# Patient Record
Sex: FEMALE | Race: WHITE
[De-identification: names, ages, dates, MRNs, and addresses within clinical notes are randomized per-mention and may not be internally consistent; named-entity substitution may affect disease eponyms.]

---

## 2018-02-23 ENCOUNTER — HOSPITAL ENCOUNTER (OUTPATIENT)
Dept: HOSPITAL 53 - M SFHCWAGY | Age: 53
End: 2018-02-23
Attending: NURSE PRACTITIONER
Payer: COMMERCIAL

## 2018-02-23 DIAGNOSIS — Z12.4: Primary | ICD-10-CM

## 2019-06-03 ENCOUNTER — HOSPITAL ENCOUNTER (OUTPATIENT)
Dept: HOSPITAL 53 - M SFHCWAGY | Age: 54
End: 2019-06-03
Attending: NURSE PRACTITIONER
Payer: COMMERCIAL

## 2019-06-03 DIAGNOSIS — Z12.4: Primary | ICD-10-CM

## 2019-06-03 PROCEDURE — 87624 HPV HI-RISK TYP POOLED RSLT: CPT

## 2019-06-04 LAB — HPV LOW VOL RFLX: (no result)

## 2019-08-28 ENCOUNTER — HOSPITAL ENCOUNTER (OUTPATIENT)
Dept: HOSPITAL 53 - M RAD | Age: 54
End: 2019-08-28
Attending: PHYSICIAN ASSISTANT
Payer: COMMERCIAL
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
## 2019-08-28 DIAGNOSIS — S83.511A: Primary | ICD-10-CM
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
## 2019-08-28 NOTE — REP
MRI right knee without contrast:

 

History:  Unilateral primary osteoarthritis right knee.  No comparison imaging.

 

Technique:  Axial, coronal, and sagittal imaging planes utilized for T1, proton

density T2-weighted scans obtain in the usual fashion with and without fat

saturation.

 

MRI findings:  There is a large right knee joint effusion.  A fairly large Baker

cyst is seen in the posteromedial popliteal soft tissues with a vertical span of

7.2 cm.  There is heterogeneous signal intensity in the joint fluid and in the

fluid within the Baker's cyst consistent with proteinaceous material.

 

Cortical and medullary bone signal intensity are normal except for a small area

of subcortical cyst formation beneath the tibial spines.  There is a very subtle

area of marrow edema in the posterior aspect the medial tibial plateau as well.

 

Patellar and quadriceps tendons appear intact.  There is mild anterior bony

hypertrophy at the distal patellar tendon attachment.  The posterior cruciate

ligament has an intact appearance.  The anterior cruciate ligament is disrupted

and discontinuous.  There is a no evidence of medial or lateral collateral

ligament disruption.  There is a small inner free margin tear of the anterior

body of the lateral meniscus.  No medial meniscal tear is appreciated.

 

There is moderate fairly diffuse chondromalacia in the patella.  There are tiny

subtle zone of subcortical marrow edema in the lateral patellar facet.  There is

a nearly full-thickness cartilage defect in the lateral tibial plateau.  There is

moderate chondromalacia in the lateral femoral condyle most pronounced

posteriorly.  There is advanced chondromalacia in the medial tibiofemoral

compartment with large areas of articular cartilage thinning in the medial

femoral condyle.

 

Impression:

 

Large joint effusion.  Large Baker's cyst.  Complete tear anterior cruciate

ligament.  Inner free margin tear anterior body lateral meniscus.  Advanced

chondromalacia changes in all three compartments as above.

 

 

Electronically Signed by

Eze Rosenthal MD 08/28/2019 07:26 P

## 2020-06-04 ENCOUNTER — HOSPITAL ENCOUNTER (OUTPATIENT)
Dept: HOSPITAL 53 - M WHC | Age: 55
End: 2020-06-04
Attending: NURSE PRACTITIONER
Payer: COMMERCIAL

## 2020-06-04 DIAGNOSIS — Z12.31: Primary | ICD-10-CM

## 2020-06-04 NOTE — REPMRS
Patient History

The patient states she had a clinical breast exam in June 2020.

No known family history of cancer.

 

3D TOMOSYNTHESIS WAS PERFORMED.

 

[facility] for [facility] 7.4%.

 

VOLPARA DENSITY A.

 

Digital Woman Screen Mammo: June 4, 2020 - Exam #: 

[id number]

Bilateral CC and MLO view(s) were taken.

 

Technologist: Kristin Sheffield, Technologist

Prior study comparison: November 1, 2016, digital woman screen 

mammo performed at [facility] 

[place].  2014, digital bilateral screening mammo, performed at 

[facility].

 

FINDINGS: There are scattered fibroglandular densities.  There 

has been no change in the appearance of the mammogram from the 

prior studies.  There is a mild amount of residual fibroglandular

tissue which is fairly symmetric. There is no interval 

development of dominant mass, architectural distortion, or 

clustered microcalcification suggestive of malignancy.

 

Assessment: BI-RADS/ACR category 1 mammogram. Negative Mammogram.

 

Recommendation

Routine screening mammogram in 1 year (for women over age 40).

This mammogram was interpreted with the aid of an FDA-approved 

computer-aided dectection system.

 

Electronically Signed By: Shimon Stone MD 06/04/20 0036

## 2021-06-08 ENCOUNTER — HOSPITAL ENCOUNTER (OUTPATIENT)
Dept: HOSPITAL 53 - M WHC | Age: 56
End: 2021-06-08
Attending: NURSE PRACTITIONER
Payer: COMMERCIAL

## 2021-06-08 DIAGNOSIS — Z12.31: Primary | ICD-10-CM

## 2021-06-08 NOTE — REPMRS
Patient History

The patient states she had a clinical breast exam in 06/2021.

Patient is postmenopausal.

No known family history of cancer.

No Hormone Replacement Therapy

 

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

 

Volpara breast density is b.

 

[facility] lifetime risk of breast cancer 7.2%.

Patient states no breast complaints today. Patient has signed MRS

History Sheet.

 

Digital Woman Screen Mammo: June 8, 2021 - Exam #: 

[id number]

Bilateral CC and MLO view(s) were taken.

 

Technologist: Latonya Swan, Technologist

Prior study comparison: June 4, 2020, bilateral digital woman 

screen mammo performed at [facility].  November 1, 2016, digital woman screen mammo performed at 

[facility].

 

FINDINGS: There are scattered fibroglandular densities.  There 

has been no change in the appearance of the mammogram from the 

prior studies.  There is a mild amount of residual fibroglandular

tissue which is fairly symmetric. There is no interval 

development of dominant mass, architectural distortion, or 

clustered microcalcification suggestive of malignancy.

 

Assessment: BI-RADS/ACR category 1 mammogram. Negative Mammogram.

 

Recommendation

Routine screening mammogram in 1 year (for women over age 40).

This mammogram was interpreted with the aid of an FDA-approved 

computer-aided dectection system.

 

Electronically Signed By: Shimon Stone MD 06/08/21 [id number]